# Patient Record
Sex: MALE | Race: WHITE | Employment: FULL TIME | URBAN - METROPOLITAN AREA
[De-identification: names, ages, dates, MRNs, and addresses within clinical notes are randomized per-mention and may not be internally consistent; named-entity substitution may affect disease eponyms.]

---

## 2019-06-20 ENCOUNTER — ANESTHESIA EVENT (OUTPATIENT)
Dept: ENDOSCOPY | Age: 54
End: 2019-06-20
Payer: COMMERCIAL

## 2019-07-08 ENCOUNTER — HOSPITAL ENCOUNTER (OUTPATIENT)
Age: 54
Setting detail: OUTPATIENT SURGERY
Discharge: HOME | End: 2019-07-08
Attending: INTERNAL MEDICINE | Admitting: INTERNAL MEDICINE
Payer: COMMERCIAL

## 2019-07-08 ENCOUNTER — ANESTHESIA (OUTPATIENT)
Dept: ENDOSCOPY | Age: 54
End: 2019-07-08
Payer: COMMERCIAL

## 2019-07-08 VITALS
HEART RATE: 60 BPM | TEMPERATURE: 97.9 F | WEIGHT: 205 LBS | DIASTOLIC BLOOD PRESSURE: 88 MMHG | BODY MASS INDEX: 27.77 KG/M2 | OXYGEN SATURATION: 99 % | HEIGHT: 72 IN | SYSTOLIC BLOOD PRESSURE: 127 MMHG | RESPIRATION RATE: 16 BRPM

## 2019-07-08 VITALS
DIASTOLIC BLOOD PRESSURE: 67 MMHG | RESPIRATION RATE: 17 BRPM | SYSTOLIC BLOOD PRESSURE: 105 MMHG | OXYGEN SATURATION: 99 %

## 2019-07-08 PROCEDURE — 3700000001 HC ADD 15 MINUTES (ANESTHESIA): Performed by: INTERNAL MEDICINE

## 2019-07-08 PROCEDURE — 6370000000 HC RX 637 (ALT 250 FOR IP): Performed by: INTERNAL MEDICINE

## 2019-07-08 PROCEDURE — 7100000010 HC PHASE II RECOVERY - FIRST 15 MIN: Performed by: INTERNAL MEDICINE

## 2019-07-08 PROCEDURE — 6360000002 HC RX W HCPCS: Performed by: NURSE ANESTHETIST, CERTIFIED REGISTERED

## 2019-07-08 PROCEDURE — 2580000003 HC RX 258: Performed by: ANESTHESIOLOGY

## 2019-07-08 PROCEDURE — 3700000000 HC ANESTHESIA ATTENDED CARE: Performed by: INTERNAL MEDICINE

## 2019-07-08 PROCEDURE — 2500000003 HC RX 250 WO HCPCS: Performed by: NURSE ANESTHETIST, CERTIFIED REGISTERED

## 2019-07-08 PROCEDURE — 3609010600 HC COLONOSCOPY POLYPECTOMY SNARE/COLD BIOPSY: Performed by: INTERNAL MEDICINE

## 2019-07-08 PROCEDURE — 7100000011 HC PHASE II RECOVERY - ADDTL 15 MIN: Performed by: INTERNAL MEDICINE

## 2019-07-08 PROCEDURE — 2709999900 HC NON-CHARGEABLE SUPPLY: Performed by: INTERNAL MEDICINE

## 2019-07-08 RX ORDER — SODIUM CHLORIDE 9 MG/ML
INJECTION, SOLUTION INTRAVENOUS CONTINUOUS
Status: DISCONTINUED | OUTPATIENT
Start: 2019-07-08 | End: 2019-07-08 | Stop reason: HOSPADM

## 2019-07-08 RX ORDER — SODIUM CHLORIDE 0.9 % (FLUSH) 0.9 %
10 SYRINGE (ML) INJECTION EVERY 12 HOURS SCHEDULED
Status: DISCONTINUED | OUTPATIENT
Start: 2019-07-08 | End: 2019-07-08 | Stop reason: HOSPADM

## 2019-07-08 RX ORDER — SODIUM CHLORIDE 0.9 % (FLUSH) 0.9 %
10 SYRINGE (ML) INJECTION PRN
Status: DISCONTINUED | OUTPATIENT
Start: 2019-07-08 | End: 2019-07-08 | Stop reason: HOSPADM

## 2019-07-08 RX ORDER — PROPOFOL 10 MG/ML
INJECTION, EMULSION INTRAVENOUS PRN
Status: DISCONTINUED | OUTPATIENT
Start: 2019-07-08 | End: 2019-07-08 | Stop reason: SDUPTHER

## 2019-07-08 RX ORDER — LIDOCAINE HYDROCHLORIDE 10 MG/ML
1 INJECTION, SOLUTION EPIDURAL; INFILTRATION; INTRACAUDAL; PERINEURAL
Status: DISCONTINUED | OUTPATIENT
Start: 2019-07-08 | End: 2019-07-08 | Stop reason: HOSPADM

## 2019-07-08 RX ORDER — LIDOCAINE HYDROCHLORIDE 20 MG/ML
INJECTION, SOLUTION INFILTRATION; PERINEURAL PRN
Status: DISCONTINUED | OUTPATIENT
Start: 2019-07-08 | End: 2019-07-08 | Stop reason: SDUPTHER

## 2019-07-08 RX ADMIN — PROPOFOL 20 MG: 10 INJECTION, EMULSION INTRAVENOUS at 09:11

## 2019-07-08 RX ADMIN — PROPOFOL 30 MG: 10 INJECTION, EMULSION INTRAVENOUS at 09:08

## 2019-07-08 RX ADMIN — PROPOFOL 20 MG: 10 INJECTION, EMULSION INTRAVENOUS at 09:19

## 2019-07-08 RX ADMIN — PROPOFOL 20 MG: 10 INJECTION, EMULSION INTRAVENOUS at 09:17

## 2019-07-08 RX ADMIN — PROPOFOL 20 MG: 10 INJECTION, EMULSION INTRAVENOUS at 09:15

## 2019-07-08 RX ADMIN — PROPOFOL 30 MG: 10 INJECTION, EMULSION INTRAVENOUS at 09:13

## 2019-07-08 RX ADMIN — PROPOFOL 20 MG: 10 INJECTION, EMULSION INTRAVENOUS at 09:16

## 2019-07-08 RX ADMIN — LIDOCAINE HYDROCHLORIDE 60 MG: 20 INJECTION, SOLUTION INFILTRATION; PERINEURAL at 09:06

## 2019-07-08 RX ADMIN — SODIUM CHLORIDE: 9 INJECTION, SOLUTION INTRAVENOUS at 08:11

## 2019-07-08 RX ADMIN — PROPOFOL 20 MG: 10 INJECTION, EMULSION INTRAVENOUS at 09:24

## 2019-07-08 RX ADMIN — PROPOFOL 20 MG: 10 INJECTION, EMULSION INTRAVENOUS at 09:22

## 2019-07-08 RX ADMIN — PROPOFOL 100 MG: 10 INJECTION, EMULSION INTRAVENOUS at 09:06

## 2019-07-08 ASSESSMENT — PAIN SCALES - GENERAL
PAINLEVEL_OUTOF10: 0

## 2019-07-08 ASSESSMENT — PAIN - FUNCTIONAL ASSESSMENT: PAIN_FUNCTIONAL_ASSESSMENT: 0-10

## 2019-07-08 NOTE — ANESTHESIA POSTPROCEDURE EVALUATION
Department of Anesthesiology  Postprocedure Note    Patient: Leopodlo Mota  MRN: [id number]  YOB: 1965  Date of evaluation: 7/8/2019  Time:  9:47 AM     Procedure Summary     Date:  07/08/19 Room / Location:  [facility] ENDO 01 / [facility] ENDOSCOPY    Anesthesia Start:  5236 Anesthesia Stop:  0859    Procedure:  COLONOSCOPY POLYPECTOMY SNARE/COLD BIOPSY (N/A ) Diagnosis:       (Z12.11 - COLON CANCER SCREENING)      (Z86.010 - HISTORY OF COLON POLYPS)    Surgeon:  John Pagan MD Responsible Provider:  Robyn Magallanes MD    Anesthesia Type:  MAC ASA Status:  3          Anesthesia Type: MAC    Jared Phase I: Jared Score: 10    Jared Phase II:      Last vitals: Reviewed and per EMR flowsheets.        Anesthesia Post Evaluation    Patient location during evaluation: PACU  Patient participation: complete - patient participated  Level of consciousness: awake and awake and alert  Pain score: 2  Airway patency: patent  Nausea & Vomiting: no vomiting  Complications: no  Cardiovascular status: blood pressure returned to baseline  Respiratory status: acceptable  Hydration status: euvolemic

## 2021-08-20 ENCOUNTER — TELEPHONE (OUTPATIENT)
Dept: DERMATOLOGY | Age: 56
End: 2021-08-20

## 2021-08-20 NOTE — TELEPHONE ENCOUNTER
Patient is requesting a return call to schedule an appt for lesions on scalp that his pcp suggested he see Dr Michael Maza. patient was last seen 2015. Patient will be out of town the next two weeks. Please advise. Thank you!

## 2021-08-23 NOTE — TELEPHONE ENCOUNTER
Spoke to patient regarding areas on scalp-3 lesions in a row, itchy and flaking (sun damage). Patient scheduled.

## 2021-10-15 NOTE — TELEPHONE ENCOUNTER
Pt had to cancel appt on 10/18 due to being call to a business trip. He stated he would really to get these spots checked out soon as possible, I did advise of 's Schedule and could put him on a cancellation list.   Please advise, Thank you!

## 2021-10-19 NOTE — TELEPHONE ENCOUNTER
Called and left message for patient to call back office. Please offer patient cancellation on 10/20/21 or 10/25/21 if still available.

## 2021-11-01 ENCOUNTER — OFFICE VISIT (OUTPATIENT)
Dept: DERMATOLOGY | Age: 56
End: 2021-11-01
Payer: COMMERCIAL

## 2021-11-01 VITALS — TEMPERATURE: 97.2 F

## 2021-11-01 DIAGNOSIS — L57.0 ACTINIC KERATOSIS: ICD-10-CM

## 2021-11-01 DIAGNOSIS — B35.1 ONYCHOMYCOSIS: ICD-10-CM

## 2021-11-01 DIAGNOSIS — D22.9 MULTIPLE NEVI: ICD-10-CM

## 2021-11-01 DIAGNOSIS — B35.3 TINEA PEDIS OF LEFT FOOT: ICD-10-CM

## 2021-11-01 PROCEDURE — G8484 FLU IMMUNIZE NO ADMIN: HCPCS | Performed by: DERMATOLOGY

## 2021-11-01 PROCEDURE — 17000 DESTRUCT PREMALG LESION: CPT | Performed by: DERMATOLOGY

## 2021-11-01 PROCEDURE — G8421 BMI NOT CALCULATED: HCPCS | Performed by: DERMATOLOGY

## 2021-11-01 PROCEDURE — 99203 OFFICE O/P NEW LOW 30 MIN: CPT | Performed by: DERMATOLOGY

## 2021-11-01 PROCEDURE — 3017F COLORECTAL CA SCREEN DOC REV: CPT | Performed by: DERMATOLOGY

## 2021-11-01 PROCEDURE — 1036F TOBACCO NON-USER: CPT | Performed by: DERMATOLOGY

## 2021-11-01 PROCEDURE — G8427 DOCREV CUR MEDS BY ELIG CLIN: HCPCS | Performed by: DERMATOLOGY

## 2021-11-01 NOTE — PROGRESS NOTES
[facility]  Raymundo Beach MD  [phone]      Pietro Burleson  1965    49 y.o. male     Date of Visit: 11/1/2021    Chief Complaint: skin lesions    History of Present Illness:    1. He complains of a few itchy lesions on the scalp. 2.  He complains of 2 thickened toenails of the left foot. 3.  He complains of a rash on the left foot - improved with OTC antifungal.    4.  He has multiple nevi - not aware of any changes in size, color or shape. Review of Systems:  Gen: Feels well, good sense of health. Past Medical History, Family History, Surgical History, Medications and Allergies reviewed. Past Medical History:   Diagnosis Date    Allergic rhinitis     Hypertension     Snores      Past Surgical History:   Procedure Laterality Date    COLONOSCOPY      hx of pol    COLONOSCOPY N/A 7/8/2019    COLONOSCOPY POLYPECTOMY SNARE/COLD BIOPSY performed by Faustino Baker MD at [facility]      x2    NASAL SEPTUM SURGERY      x2    SHOULDER SURGERY         Allergies   Allergen Reactions    Nafcillin      arm itching and rash    Naproxen      Tightness in throat     Outpatient Medications Marked as Taking for the 11/1/21 encounter (Office Visit) with Kandi Wise MD   Medication Sig Dispense Refill    ciclopirox (LOPROX) 0.77 % cream Apply to the left foot twice daily for 4 weeks. 30 g 2    ciclopirox (PENLAC) 8 % solution Apply to 3rd and 4th toenails of the left foot daily for up to 1 year. Remove residue every 7 days.  6 mL 2    Multiple Vitamins-Minerals (MULTIVITAMIN ADULT PO) Take 1 tablet by mouth daily      Cholecalciferol (VITAMIN D3) 1000 units CAPS Take 10,000 Units by mouth daily      OMEGA-3 KRILL OIL PO Take 750 mg by mouth every morning (before breakfast)      losartan-hydrochlorothiazide (HYZAAR) 100-12.5 MG per tablet   5    fluticasone (FLONASE) 50 MCG/ACT nasal spray   5 Physical Examination       The following were examined and determined to be normal: Psych/Neuro, Head/face, Conjunctivae/eyelids, Gums/teeth/lips, Neck, Breast/axilla/chest, Abdomen, Back, RUE, LUE and RLE. The following were examined and determined to be abnormal: Scalp/hair, LLE and Nails/digits. Well appearing. 1.  Mid vertex scalp - 1 scaly pink macule. 2.  3rd and 4th toenails of the left foot with distal onycholysis and mild subungual hyperkeratosis. 3.  Plantar surface of the left foot with diffuse scaling. 4.  Trunk and extremities with multiple well defined round to oval smooth brown macules and papules. Assessment and Plan     1. Actinic keratosis     2 cycles of liquid nitrogen applied to 1 AK on the mid vertex scalp. Patient was educated regarding the potential risks of blister formation and discomfort. Wound care was discussed. 2. Onychomycosis - mild and limited to 2 toenails    Penlac solution daily for up to 1 year. 3. Tinea pedis of left foot     Loprox cream twice daily for 2 to 4 weeks. 4. Multiple nevi - benign appearing    Sun protective behaviors, including use of at least SPF 30 sunscreen, and self skin examinations were encouraged. Call for any new or concerning lesions. Return in about 1 year (around 11/1/2022).     --Joycie Hatchet, MD

## 2022-11-01 ENCOUNTER — OFFICE VISIT (OUTPATIENT)
Dept: DERMATOLOGY | Age: 57
End: 2022-11-01
Payer: COMMERCIAL

## 2022-11-01 DIAGNOSIS — D22.9 MULTIPLE NEVI: ICD-10-CM

## 2022-11-01 DIAGNOSIS — B35.1 ONYCHOMYCOSIS: Primary | ICD-10-CM

## 2022-11-01 DIAGNOSIS — L91.8 SKIN TAG: ICD-10-CM

## 2022-11-01 PROCEDURE — G8421 BMI NOT CALCULATED: HCPCS | Performed by: DERMATOLOGY

## 2022-11-01 PROCEDURE — 99213 OFFICE O/P EST LOW 20 MIN: CPT | Performed by: DERMATOLOGY

## 2022-11-01 PROCEDURE — G8427 DOCREV CUR MEDS BY ELIG CLIN: HCPCS | Performed by: DERMATOLOGY

## 2022-11-01 PROCEDURE — G8484 FLU IMMUNIZE NO ADMIN: HCPCS | Performed by: DERMATOLOGY

## 2022-11-01 PROCEDURE — 1036F TOBACCO NON-USER: CPT | Performed by: DERMATOLOGY

## 2022-11-01 PROCEDURE — 3017F COLORECTAL CA SCREEN DOC REV: CPT | Performed by: DERMATOLOGY

## 2022-11-01 NOTE — PROGRESS NOTES
[facility]  Larisa Dupont MD  [phone]      Chao Maher  1965    56 y.o. male     Date of Visit: 11/1/2022    Chief Complaint: toenail issue, skin lesions    History of Present Illness:    1. He has chronic onychomycosis limited to the 3rd and 4th toenails of the left foot - he reports improvement with application of Penlac solution. 2.  He reports an asymptomatic lesion in the left axilla. 3.  He has multiple nevi - not aware of any changes in size, color or shape. Tinea pedis of the left foot - cleared with ciclopirox cream.       Review of Systems:  Gen: Feels well, good sense of health. Past Medical History, Family History, Surgical History, Medications and Allergies reviewed. Past Medical History:   Diagnosis Date    Allergic rhinitis     Hypertension     Snores      Past Surgical History:   Procedure Laterality Date    COLONOSCOPY      hx of [facility]    COLONOSCOPY N/A 7/8/2019    COLONOSCOPY POLYPECTOMY SNARE/COLD BIOPSY performed by Saurabh Leo MD at [address] [place] [address]      x2    NASAL SEPTUM SURGERY      x2    SHOULDER SURGERY         Allergies   Allergen Reactions    Nafcillin      arm itching and rash    Naproxen      Tightness in throat     Outpatient Medications Marked as Taking for the 11/1/22 encounter (Office Visit) with Mk Coker MD   Medication Sig Dispense Refill    ciclopirox (PENLAC) 8 % solution Apply to 3rd and 4th toenails of the left foot daily for up to 1 year. Remove residue every 7 days. 6 mL 2    ciclopirox (LOPROX) 0.77 % cream Apply to the left foot twice daily for 4 weeks.  30 g 2    Multiple Vitamins-Minerals (MULTIVITAMIN ADULT PO) Take 1 tablet by mouth daily      Cholecalciferol (VITAMIN D3) 1000 units CAPS Take 10,000 Units by mouth daily      OMEGA-3 KRILL OIL PO Take 750 mg by mouth every morning (before breakfast)      losartan-hydrochlorothiazide (HYZAAR) 100-12.5 MG per tablet   5    fluticasone (FLONASE) 50 MCG/ACT nasal spray   5           Physical Examination       The following were examined and determined to be normal: Psych/Neuro, Scalp/hair, Head/face, Conjunctivae/eyelids, Gums/teeth/lips, Neck, Breast/axilla/chest, Abdomen, Back, RUE, LUE, RLE, and LLE. The following were examined and determined to be abnormal: Nails/digits. Well appearing. 1.  Left 3rd and 4th toes - very mild distal onycholysis and subungual hyperkeratosis. 2.  Left posterior axillary region with a small pedunculated pink papule. 3.  Trunk and extremities with multiple well defined round to oval smooth brown macules and papules. Assessment and Plan     1. Onychomycosis of the left 3rd/4th toenails - improving with topical therapy    Continue ciclopirox solution daily until it clears up. 2. Skin tag     Reassurance. 3. Multiple nevi - benign appearing    Sun protective behaviors, including use of at least SPF 30 sunscreen, and self skin examinations were encouraged. Call for any new or concerning lesions. Return in about 1 year (around 11/1/2023).     --Foy Romberg, MD

## 2023-03-13 ENCOUNTER — TELEPHONE (OUTPATIENT)
Dept: DERMATOLOGY | Age: 58
End: 2023-03-13

## 2023-03-13 RX ORDER — VALACYCLOVIR HYDROCHLORIDE 1 G/1
2000 TABLET, FILM COATED ORAL 2 TIMES DAILY
Qty: 12 TABLET | Refills: 0 | Status: SHIPPED | OUTPATIENT
Start: 2023-03-13 | End: 2023-03-14

## 2023-03-13 NOTE — TELEPHONE ENCOUNTER
Pt requesting a refill for Valtrex pt stated he has a cold sore on lip   Please send to [facility]   Please advise   Thanks   C/b [phone]

## 2023-11-01 ENCOUNTER — OFFICE VISIT (OUTPATIENT)
Dept: DERMATOLOGY | Age: 58
End: 2023-11-01
Payer: COMMERCIAL

## 2023-11-01 DIAGNOSIS — D22.9 MULTIPLE NEVI: Primary | ICD-10-CM

## 2023-11-01 DIAGNOSIS — B35.3 TINEA PEDIS OF LEFT FOOT: ICD-10-CM

## 2023-11-01 PROCEDURE — G8421 BMI NOT CALCULATED: HCPCS | Performed by: DERMATOLOGY

## 2023-11-01 PROCEDURE — 1036F TOBACCO NON-USER: CPT | Performed by: DERMATOLOGY

## 2023-11-01 PROCEDURE — 99213 OFFICE O/P EST LOW 20 MIN: CPT | Performed by: DERMATOLOGY

## 2023-11-01 PROCEDURE — 3017F COLORECTAL CA SCREEN DOC REV: CPT | Performed by: DERMATOLOGY

## 2023-11-01 PROCEDURE — G8427 DOCREV CUR MEDS BY ELIG CLIN: HCPCS | Performed by: DERMATOLOGY

## 2023-11-01 PROCEDURE — G8484 FLU IMMUNIZE NO ADMIN: HCPCS | Performed by: DERMATOLOGY

## 2023-11-01 RX ORDER — VALACYCLOVIR HYDROCHLORIDE 1 G/1
2000 TABLET, FILM COATED ORAL 2 TIMES DAILY
Qty: 12 TABLET | Refills: 0 | Status: SHIPPED | OUTPATIENT
Start: 2023-11-01 | End: 2023-11-02

## 2023-11-01 NOTE — PROGRESS NOTES
[facility]  Bradly Robles MD  [phone]      Mary Ann Carvajal  1965    66 y.o. male     Date of Visit: 11/1/2023    Chief Complaint: moles    History of Present Illness:    1. He presents today for evaluation of multiple moles on the trunk and extremities-not aware of any changes in size, color, or shape. 2.  He reports a recurrence of an asymptomatic rash on the left foot. He has a history of tinea pedis. He also has a history of onychomycosis of the left 3rd and 4th toenails - cleared up with Penla. Review of Systems:  Gen: Feels well, good sense of health. Past Medical History, Family History, Surgical History, Medications and Allergies reviewed. Past Medical History:   Diagnosis Date    Allergic rhinitis     Hypertension     Snores      Past Surgical History:   Procedure Laterality Date    COLONOSCOPY      hx of pol    COLONOSCOPY N/A 7/8/2019    COLONOSCOPY POLYPECTOMY SNARE/COLD BIOPSY performed by Berry Parrish MD at [address]      x2    NASAL SEPTUM SURGERY      x2    SHOULDER SURGERY         Allergies   Allergen Reactions    Nafcillin      arm itching and rash    Naproxen      Tightness in throat     Outpatient Medications Marked as Taking for the 11/1/23 encounter (Office Visit) with Edin Blanc MD   Medication Sig Dispense Refill    ciclopirox (LOPROX) 0.77 % cream Apply to the left foot twice daily for 4 weeks. 30 g 2    valACYclovir (VALTREX) 1 g tablet Take 2 tablets by mouth 2 times daily for 1 day Take at the first symptoms of recurrence of a cold sore.  12 tablet 0    Multiple Vitamins-Minerals (MULTIVITAMIN ADULT PO) Take 1 tablet by mouth daily      Cholecalciferol (VITAMIN D3) 1000 units CAPS Take 10,000 Units by mouth daily      OMEGA-3 KRILL OIL PO Take 750 mg by mouth every morning (before breakfast)      losartan-hydrochlorothiazide (HYZAAR) 100-12.5 MG per tablet   5    fluticasone

## 2024-03-11 ENCOUNTER — TELEPHONE (OUTPATIENT)
Dept: DERMATOLOGY | Age: 59
End: 2024-03-11

## 2024-03-11 NOTE — TELEPHONE ENCOUNTER
Called and spoke to patient to clarify whether patient was needing ciclopirox cream or solution.   He needs the solution.

## 2024-03-11 NOTE — TELEPHONE ENCOUNTER
Pt calling to refill valatyclovir and ticlopirox please call him to confirm we can refill [phone]

## 2024-03-12 RX ORDER — VALACYCLOVIR HYDROCHLORIDE 1 G/1
2000 TABLET, FILM COATED ORAL 2 TIMES DAILY
Qty: 12 TABLET | Refills: 0 | Status: SHIPPED | OUTPATIENT
Start: 2024-03-12 | End: 2024-03-13

## 2024-03-12 RX ORDER — CICLOPIROX 80 MG/ML
SOLUTION TOPICAL
Qty: 6 ML | Refills: 2 | Status: SHIPPED | OUTPATIENT
Start: 2024-03-12

## 2024-08-19 ENCOUNTER — TELEPHONE (OUTPATIENT)
Dept: DERMATOLOGY | Age: 59
End: 2024-08-19

## 2024-08-29 ENCOUNTER — OFFICE VISIT (OUTPATIENT)
Dept: DERMATOLOGY | Age: 59
End: 2024-08-29
Payer: COMMERCIAL

## 2024-08-29 DIAGNOSIS — L63.9 ALOPECIA AREATA: Primary | ICD-10-CM

## 2024-08-29 DIAGNOSIS — B35.3 TINEA PEDIS OF LEFT FOOT: ICD-10-CM

## 2024-08-29 DIAGNOSIS — B35.1 ONYCHOMYCOSIS: ICD-10-CM

## 2024-08-29 PROCEDURE — G8421 BMI NOT CALCULATED: HCPCS | Performed by: DERMATOLOGY

## 2024-08-29 PROCEDURE — 3017F COLORECTAL CA SCREEN DOC REV: CPT | Performed by: DERMATOLOGY

## 2024-08-29 PROCEDURE — G8427 DOCREV CUR MEDS BY ELIG CLIN: HCPCS | Performed by: DERMATOLOGY

## 2024-08-29 PROCEDURE — 99213 OFFICE O/P EST LOW 20 MIN: CPT | Performed by: DERMATOLOGY

## 2024-08-29 PROCEDURE — 1036F TOBACCO NON-USER: CPT | Performed by: DERMATOLOGY

## 2024-08-29 NOTE — PROGRESS NOTES
[facility]  Anand Cruz MD  [phone]      Claudy Ray  1965    59 y.o. male     Date of Visit: 8/29/2024    Chief Complaint: hair loss    History of Present Illness:    1.  He presents today for an accumulation of alopecic patches on the scalp and extremities.  He is interested in treatment.    2.  He has a history of onychomycosis of the first 3 toenails of the left foot that improved in the past with Penlac.  He recently restarted using it with progressive toenail dystrophy.      Review of Systems:  Gen: Feels well, good sense of health.    Past Medical History, Family History, Surgical History, Medications and Allergies reviewed.    Past Medical History:   Diagnosis Date    Allergic rhinitis     Hypertension     Snores      Past Surgical History:   Procedure Laterality Date    COLONOSCOPY      hx of polps    COLONOSCOPY N/A 7/8/2019    COLONOSCOPY POLYPECTOMY SNARE/COLD BIOPSY performed by Yanira Salcido MD at [facility] ENDOSCOPY    HERNIA REPAIR      KNEE SURGERY      x2    NASAL SEPTUM SURGERY      x2    SHOULDER SURGERY         Allergies   Allergen Reactions    Nafcillin      arm itching and rash    Naproxen      Tightness in throat     Outpatient Medications Marked as Taking for the 8/29/24 encounter (Office Visit) with Anand Cruz MD   Medication Sig Dispense Refill    ciclopirox (PENLAC) 8 % solution Apply to 3rd and 4th toenails of the left foot daily for up to 1 year.  Remove residue every 7 days. 6 mL 2    ciclopirox (LOPROX) 0.77 % cream Apply to the left foot twice daily for 4 weeks. 30 g 2    ciclopirox (PENLAC) 8 % solution Apply to 3rd and 4th toenails of the left foot daily for up to 1 year.  Remove residue every 7 days. 6 mL 2    Multiple Vitamins-Minerals (MULTIVITAMIN ADULT PO) Take 1 tablet by mouth daily      Cholecalciferol (VITAMIN D3) 1000 units CAPS Take 10,000 Units by mouth daily      OMEGA-3 KRILL OIL PO Take 750 mg by mouth every morning

## 2024-09-30 ENCOUNTER — OFFICE VISIT (OUTPATIENT)
Dept: DERMATOLOGY | Age: 59
End: 2024-09-30
Payer: COMMERCIAL

## 2024-09-30 DIAGNOSIS — L63.9 ALOPECIA AREATA: Primary | ICD-10-CM

## 2024-09-30 PROCEDURE — 11901 INJECT SKIN LESIONS >7: CPT | Performed by: DERMATOLOGY

## 2024-09-30 NOTE — PROGRESS NOTES
[facility]at[facility]  Anand Cruz MD  [phone]      Claudy Ray  1965    59 y.o. male     Date of Visit: 9/30/2024    Chief Complaint: alopecia areata    History of Present Illness:    He was last seen about 4 weeks ago.    He returns today to follow-up for patchy alopecia areata of the scalp with moderately extensive involvement.  He had intralesional Kenalog injections to 15 patches on the scalp at last visit.  Has not noted much growth yet.       Review of Systems:  Gen: Feels well, good sense of health.    Past Medical History, Family History, Surgical History, Medications and Allergies reviewed.    Past Medical History:   Diagnosis Date    Allergic rhinitis     Hypertension     Snores      Past Surgical History:   Procedure Laterality Date    COLONOSCOPY      hx of polps    COLONOSCOPY N/A 7/8/2019    COLONOSCOPY POLYPECTOMY SNARE/COLD BIOPSY performed by Yanira Salcido MD at [facility] ENDOSCOPY    HERNIA REPAIR      KNEE SURGERY      x2    NASAL SEPTUM SURGERY      x2    SHOULDER SURGERY         Allergies   Allergen Reactions    Nafcillin      arm itching and rash    Naproxen      Tightness in throat     Outpatient Medications Marked as Taking for the 9/30/24 encounter (Office Visit) with Anand Cruz MD   Medication Sig Dispense Refill    ciclopirox (PENLAC) 8 % solution Apply to 3rd and 4th toenails of the left foot daily for up to 1 year.  Remove residue every 7 days. 6 mL 2    ciclopirox (LOPROX) 0.77 % cream Apply to the left foot twice daily for 4 weeks. 30 g 2    ciclopirox (PENLAC) 8 % solution Apply to 3rd and 4th toenails of the left foot daily for up to 1 year.  Remove residue every 7 days. 6 mL 2    Multiple Vitamins-Minerals (MULTIVITAMIN ADULT PO) Take 1 tablet by mouth daily      Cholecalciferol (VITAMIN D3) 1000 units CAPS Take 10 capsules by mouth daily      OMEGA-3 KRILL OIL PO Take 750 mg by mouth every morning (before breakfast)

## 2024-10-29 ENCOUNTER — OFFICE VISIT (OUTPATIENT)
Dept: DERMATOLOGY | Age: 59
End: 2024-10-29
Payer: COMMERCIAL

## 2024-10-29 DIAGNOSIS — L63.9 ALOPECIA AREATA: Primary | ICD-10-CM

## 2024-10-29 PROCEDURE — 11901 INJECT SKIN LESIONS >7: CPT | Performed by: DERMATOLOGY

## 2024-10-29 NOTE — PROGRESS NOTES
[facility]at[facility]  Anand Cruz MD  [phone]      Claudy Ray  1965    59 y.o. male     Date of Visit: 10/29/2024    Chief Complaint: alopecia areata    History of Present Illness:    He returns today to follow-up for extensive alopecia areata of the scalp.  He has had 2 series of injections with intralesional Kenalog over the last couple of months.  He presents today for additional injections.      Review of Systems:  Gen: Feels well, good sense of health.      Past Medical History, Family History, Surgical History, Medications and Allergies reviewed.    Past Medical History:   Diagnosis Date    Allergic rhinitis     Hypertension     Snores      Past Surgical History:   Procedure Laterality Date    COLONOSCOPY      hx of polps    COLONOSCOPY N/A 7/8/2019    COLONOSCOPY POLYPECTOMY SNARE/COLD BIOPSY performed by Yanira Salcido MD at [facility] ENDOSCOPY    HERNIA REPAIR      KNEE SURGERY      x2    NASAL SEPTUM SURGERY      x2    SHOULDER SURGERY         Allergies   Allergen Reactions    Nafcillin      arm itching and rash    Naproxen      Tightness in throat     Outpatient Medications Marked as Taking for the 10/29/24 encounter (Office Visit) with Anand Cruz MD   Medication Sig Dispense Refill    ciclopirox (PENLAC) 8 % solution Apply to 3rd and 4th toenails of the left foot daily for up to 1 year.  Remove residue every 7 days. 6 mL 2    ciclopirox (LOPROX) 0.77 % cream Apply to the left foot twice daily for 4 weeks. 30 g 2    ciclopirox (PENLAC) 8 % solution Apply to 3rd and 4th toenails of the left foot daily for up to 1 year.  Remove residue every 7 days. 6 mL 2    Multiple Vitamins-Minerals (MULTIVITAMIN ADULT PO) Take 1 tablet by mouth daily      Cholecalciferol (VITAMIN D3) 1000 units CAPS Take 10 capsules by mouth daily      OMEGA-3 KRILL OIL PO Take 750 mg by mouth every morning (before breakfast)      losartan-hydrochlorothiazide (HYZAAR) 100-12.5 MG per tablet   5

## 2024-11-11 RX ORDER — CICLOPIROX 80 MG/ML
SOLUTION TOPICAL
Qty: 6 ML | Refills: 2 | Status: SHIPPED | OUTPATIENT
Start: 2024-11-11

## 2024-11-13 ENCOUNTER — TELEPHONE (OUTPATIENT)
Dept: DERMATOLOGY | Age: 59
End: 2024-11-13

## 2024-11-13 NOTE — TELEPHONE ENCOUNTER
Submitted PA for Ciclopirox  Via CMM Key: [id number]   STATUS: This medication or product is on your plan's list of covered drugs. Prior authorization is not required at this time. If your pharmacy has questions regarding the processing of your prescription, please have them call the [employer] pharmacy help desk at [phone].     If this requires a response please respond to the pool ( P MHCX PSC MEDICATION PRE-AUTH).      Thank you please advise patient.

## 2024-11-25 ENCOUNTER — OFFICE VISIT (OUTPATIENT)
Dept: DERMATOLOGY | Age: 59
End: 2024-11-25
Payer: COMMERCIAL

## 2024-11-25 DIAGNOSIS — L63.9 ALOPECIA AREATA: Primary | ICD-10-CM

## 2024-11-25 PROCEDURE — 11901 INJECT SKIN LESIONS >7: CPT | Performed by: DERMATOLOGY

## 2024-11-25 NOTE — PROGRESS NOTES
[facility]  Anand Cruz MD  [phone]      Claudy Ray  1965    59 y.o. male     Date of Visit: 11/25/2024    Chief Complaint: alopecia areata    History of Present Illness:    He returns today for extensive alopecia areata of the scalp.  Has had gradual improvement with intralesional Kenalog injections.  He has noticed a new patch on the right lateral eyebrow and also on the right side of the beard.      Review of Systems:  Gen: Feels well, good sense of health.    Past Medical History, Family History, Surgical History, Medications and Allergies reviewed.    Past Medical History:   Diagnosis Date    Allergic rhinitis     Hypertension     Snores      Past Surgical History:   Procedure Laterality Date    COLONOSCOPY      hx of polps    COLONOSCOPY N/A 7/8/2019    COLONOSCOPY POLYPECTOMY SNARE/COLD BIOPSY performed by Yanira Salcido MD at [facility] ENDOSCOPY    HERNIA REPAIR      KNEE SURGERY      x2    NASAL SEPTUM SURGERY      x2    SHOULDER SURGERY         Allergies   Allergen Reactions    Nafcillin      arm itching and rash    Naproxen      Tightness in throat     Outpatient Medications Marked as Taking for the 11/25/24 encounter (Office Visit) with Anand Cruz MD   Medication Sig Dispense Refill    ciclopirox (PENLAC) 8 % solution Apply to 3rd and 4th toenails of the left foot daily for up to 1 year.  Remove residue every 7 days. 6 mL 2    ciclopirox (LOPROX) 0.77 % cream Apply to the left foot twice daily for 4 weeks. 30 g 2    ciclopirox (PENLAC) 8 % solution Apply to 3rd and 4th toenails of the left foot daily for up to 1 year.  Remove residue every 7 days. 6 mL 2    Multiple Vitamins-Minerals (MULTIVITAMIN ADULT PO) Take 1 tablet by mouth daily      Cholecalciferol (VITAMIN D3) 1000 units CAPS Take 10 capsules by mouth daily      OMEGA-3 KRILL OIL PO Take 750 mg by mouth every morning (before breakfast)      losartan-hydrochlorothiazide (HYZAAR) 100-12.5 MG per

## 2024-12-17 ENCOUNTER — OFFICE VISIT (OUTPATIENT)
Age: 59
End: 2024-12-17
Payer: COMMERCIAL

## 2024-12-17 DIAGNOSIS — L63.9 ALOPECIA AREATA: Primary | ICD-10-CM

## 2024-12-17 PROCEDURE — 3017F COLORECTAL CA SCREEN DOC REV: CPT | Performed by: DERMATOLOGY

## 2024-12-17 PROCEDURE — 96372 THER/PROPH/DIAG INJ SC/IM: CPT | Performed by: DERMATOLOGY

## 2024-12-17 PROCEDURE — 1036F TOBACCO NON-USER: CPT | Performed by: DERMATOLOGY

## 2024-12-17 PROCEDURE — 99214 OFFICE O/P EST MOD 30 MIN: CPT | Performed by: DERMATOLOGY

## 2024-12-17 PROCEDURE — G8427 DOCREV CUR MEDS BY ELIG CLIN: HCPCS | Performed by: DERMATOLOGY

## 2024-12-17 PROCEDURE — G8484 FLU IMMUNIZE NO ADMIN: HCPCS | Performed by: DERMATOLOGY

## 2024-12-17 PROCEDURE — G8421 BMI NOT CALCULATED: HCPCS | Performed by: DERMATOLOGY

## 2024-12-17 RX ORDER — TRIAMCINOLONE ACETONIDE 40 MG/ML
80 INJECTION, SUSPENSION INTRA-ARTICULAR; INTRAMUSCULAR ONCE
Status: COMPLETED | OUTPATIENT
Start: 2024-12-17 | End: 2024-12-17

## 2024-12-17 RX ADMIN — TRIAMCINOLONE ACETONIDE 80 MG: 40 INJECTION, SUSPENSION INTRA-ARTICULAR; INTRAMUSCULAR at 08:42

## 2024-12-17 NOTE — PROGRESS NOTES
mg by mouth every morning (before breakfast)      losartan-hydrochlorothiazide (HYZAAR) 100-12.5 MG per tablet   5    fluticasone (FLONASE) 50 MCG/ACT nasal spray   5         Physical Examination       Well-appearing.    1.  Right upper lip with a small alopecic macule.  Eyebrows intact.  Eyelashes intact.  Frontal scalp with near complete loss of hair.  Temporal, parietal and occipital portions of the scalp with smaller alopecic patches.                       Assessment and Plan     1. Alopecia areata - extensive, only mild improvement with intralesional Kenalog injections; not at treatment goal    We discussed other options.  He is agreeable to intramuscular Kenalog today.  We also discussed consideration of Nithin inhibitor therapy particularly with Olumiant.  He will consider in the future.    Intramuscular Kenalog 80 mg administered subcutaneously today.  Discussed risks including effects on appetite, sleep, weight gain, and adverse bone effects.         Return in about 4 weeks (around 1/14/2025).    --Anand Cruz MD

## 2025-01-22 ENCOUNTER — OFFICE VISIT (OUTPATIENT)
Age: 60
End: 2025-01-22
Payer: COMMERCIAL

## 2025-01-22 DIAGNOSIS — L63.9 ALOPECIA AREATA: Primary | ICD-10-CM

## 2025-01-22 PROCEDURE — G8427 DOCREV CUR MEDS BY ELIG CLIN: HCPCS | Performed by: DERMATOLOGY

## 2025-01-22 PROCEDURE — G8421 BMI NOT CALCULATED: HCPCS | Performed by: DERMATOLOGY

## 2025-01-22 PROCEDURE — 3017F COLORECTAL CA SCREEN DOC REV: CPT | Performed by: DERMATOLOGY

## 2025-01-22 PROCEDURE — 96372 THER/PROPH/DIAG INJ SC/IM: CPT | Performed by: DERMATOLOGY

## 2025-01-22 PROCEDURE — 99213 OFFICE O/P EST LOW 20 MIN: CPT | Performed by: DERMATOLOGY

## 2025-01-22 PROCEDURE — 1036F TOBACCO NON-USER: CPT | Performed by: DERMATOLOGY

## 2025-01-22 RX ORDER — TRIAMCINOLONE ACETONIDE 40 MG/ML
80 INJECTION, SUSPENSION INTRA-ARTICULAR; INTRAMUSCULAR ONCE
Status: COMPLETED | OUTPATIENT
Start: 2025-01-22 | End: 2025-01-22

## 2025-01-22 RX ADMIN — TRIAMCINOLONE ACETONIDE 80 MG: 40 INJECTION, SUSPENSION INTRA-ARTICULAR; INTRAMUSCULAR at 08:27

## 2025-01-22 NOTE — PROGRESS NOTES
[facility] Dermat[facility]  Anand Cruz MD  [phone]      Claudy Ray  1965    59 y.o. male     Date of Visit: 1/22/2025    Chief Complaint: alopecia areata    History of Present Illness:    Henry is a 59-year-old white male who presents today to follow-up for severe alopecia areata affecting the scalp, right eyebrow and beard region.  He had minor improvement with intralesional Kenalog injections initially.  At last visit he received intramuscular Kenalog 80 mg with marked improvement.  He reports new hair growth on the scalp and right eyebrow.  He reports a shrinking alopecic patch on the left forearm.      Review of Systems:  Gen: Feels well, good sense of health.    Past Medical History, Family History, Surgical History, Medications and Allergies reviewed.    Past Medical History:   Diagnosis Date    Allergic rhinitis     Hypertension     Snores      Past Surgical History:   Procedure Laterality Date    COLONOSCOPY      hx of polps    COLONOSCOPY N/A 7/8/2019    COLONOSCOPY POLYPECTOMY SNARE/COLD BIOPSY performed by Yanira Salcido MD at [facility] ENDOSCOPY    HERNIA REPAIR      KNEE SURGERY      x2    NASAL SEPTUM SURGERY      x2    SHOULDER SURGERY         Allergies   Allergen Reactions    Nafcillin      arm itching and rash    Naproxen      Tightness in throat     Outpatient Medications Marked as Taking for the 1/22/25 encounter (Office Visit) with Anand Cruz MD   Medication Sig Dispense Refill    ciclopirox (PENLAC) 8 % solution Apply to 3rd and 4th toenails of the left foot daily for up to 1 year.  Remove residue every 7 days. 6 mL 2    ciclopirox (LOPROX) 0.77 % cream Apply to the left foot twice daily for 4 weeks. 30 g 2    ciclopirox (PENLAC) 8 % solution Apply to 3rd and 4th toenails of the left foot daily for up to 1 year.  Remove residue every 7 days. 6 mL 2    Multiple Vitamins-Minerals (MULTIVITAMIN ADULT PO) Take 1 tablet by mouth daily      Cholecalciferol (VITAMIN

## 2025-02-12 ENCOUNTER — TELEPHONE (OUTPATIENT)
Age: 60
End: 2025-02-12

## 2025-02-12 NOTE — TELEPHONE ENCOUNTER
Pt called and states needs to r/s appointment for 02-19,he states he has to come every month to get this treatment. I didn't cancel it yet unitll I know we can get him soon.            [phone]

## 2025-02-21 ENCOUNTER — TELEPHONE (OUTPATIENT)
Age: 60
End: 2025-02-21

## 2025-02-21 NOTE — TELEPHONE ENCOUNTER
Patient needs to reschedule 1 month ILK.  He needs to go out of town for his mom who is ill.  He will be back in town 2/28.  Could he come in after that?  [phone]

## 2025-03-04 ENCOUNTER — OFFICE VISIT (OUTPATIENT)
Age: 60
End: 2025-03-04

## 2025-03-04 DIAGNOSIS — L63.9 ALOPECIA AREATA: Primary | ICD-10-CM

## 2025-03-04 NOTE — PROGRESS NOTES
mg by mouth every morning (before breakfast)      losartan-hydrochlorothiazide (HYZAAR) 100-12.5 MG per tablet   5    fluticasone (FLONASE) 50 MCG/ACT nasal spray   5     Physical Examination     Well-appearing.    1.  Scattered on the scalp are several smaller round oval shaped smooth alopecic patches.  Hair regrowth noted on the frontal temporal and superior parietal portions of the scalp.  Right lateral upper lip with few round smooth alopecic patches.  Eyebrow with complete regrowth.                       Assessment and Plan     1. Extensive alopecia areata of the scalp and focally involvement the eyebrow and beard - improved with systemic steroids (IM Kenalog x 2).      Intralesional Kenalog 5 mg/mL - 1.5 mL injected into 8 small patches on the scalp.     Discussed consideration of Litfulo if worsens.         Return in 4 to 6 weeks.     --Anand Cruz MD

## 2025-04-17 ENCOUNTER — OFFICE VISIT (OUTPATIENT)
Age: 60
End: 2025-04-17
Payer: COMMERCIAL

## 2025-04-17 DIAGNOSIS — L63.9 ALOPECIA AREATA: Primary | ICD-10-CM

## 2025-04-17 DIAGNOSIS — Z79.899 HIGH RISK MEDICATION USE: ICD-10-CM

## 2025-04-17 PROCEDURE — G8427 DOCREV CUR MEDS BY ELIG CLIN: HCPCS | Performed by: DERMATOLOGY

## 2025-04-17 PROCEDURE — 1036F TOBACCO NON-USER: CPT | Performed by: DERMATOLOGY

## 2025-04-17 PROCEDURE — G8421 BMI NOT CALCULATED: HCPCS | Performed by: DERMATOLOGY

## 2025-04-17 PROCEDURE — 99214 OFFICE O/P EST MOD 30 MIN: CPT | Performed by: DERMATOLOGY

## 2025-04-17 PROCEDURE — 3017F COLORECTAL CA SCREEN DOC REV: CPT | Performed by: DERMATOLOGY

## 2025-04-17 NOTE — PROGRESS NOTES
[facility]  Anadn Cruz MD  [phone]      Claudy Ray  1965    60 y.o. male     Date of Visit: 4/17/2025    Chief Complaint: alopecia areata    History of Present Illness:    He returns today to follow-up for extensive alopecia areata affecting the scalp, eyebrows, beard and left forearm.  He experienced marked temporary improvement but not complete regrowth with intramuscular Kenalog injections administered in December 2024 and January 2025.  He had few persistent patches at last visit that were treated with intralesional Kenalog.  Unfortunately he reports progression of the disease since he was last seen about 6 weeks ago.  He has developed new alopecic patches on the scalp and within the beard.    Past medical history notable for hypertension controlled with losartan.  He reports that he had a cardiac calcium CT score and was offered a statin by his primary care physician but elected not to take it at this time.      Review of Systems:  Gen: Feels well, good sense of health.    Past Medical History, Family History, Surgical History, Medications and Allergies reviewed.    Past Medical History:   Diagnosis Date    Allergic rhinitis     Hypertension     Snores      Past Surgical History:   Procedure Laterality Date    COLONOSCOPY      hx of polps    COLONOSCOPY N/A 7/8/2019    COLONOSCOPY POLYPECTOMY SNARE/COLD BIOPSY performed by Yanira Salcido MD at [facility] ENDOSCOPY    HERNIA REPAIR      KNEE SURGERY      x2    NASAL SEPTUM SURGERY      x2    SHOULDER SURGERY         Allergies   Allergen Reactions    Nafcillin      arm itching and rash    Naproxen      Tightness in throat     Outpatient Medications Marked as Taking for the 4/17/25 encounter (Office Visit) with Anand Cruz MD   Medication Sig Dispense Refill    ciclopirox (PENLAC) 8 % solution Apply to 3rd and 4th toenails of the left foot daily for up to 1 year.  Remove residue every 7 days. 6 mL 2    ciclopirox

## 2025-04-25 LAB
M TB TUBERC IFN-G/MITOGEN IGNF BLD: NORMAL IU/ML
POSITIVE CONTROL: 9.97 IU/ML
QFTB TB2 MINUS NIL: NORMAL IU/ML
QUANTIFERON (R) TB GOLD (INCUBATED): NEGATIVE
QUANTIFERON MITOGEN MINUS NIL: 0.03 IU/ML

## 2025-05-12 ENCOUNTER — RESULTS FOLLOW-UP (OUTPATIENT)
Age: 60
End: 2025-05-12

## 2025-05-15 RX ORDER — CICLOPIROX 80 MG/ML
SOLUTION TOPICAL
Qty: 19.8 ML | Refills: 0 | Status: SHIPPED | OUTPATIENT
Start: 2025-05-15

## 2025-09-03 ENCOUNTER — OFFICE VISIT (OUTPATIENT)
Age: 60
End: 2025-09-03
Payer: COMMERCIAL

## 2025-09-03 DIAGNOSIS — D22.9 MULTIPLE NEVI: ICD-10-CM

## 2025-09-03 DIAGNOSIS — B35.3 TINEA PEDIS OF LEFT FOOT: ICD-10-CM

## 2025-09-03 DIAGNOSIS — B35.1 ONYCHOMYCOSIS: ICD-10-CM

## 2025-09-03 DIAGNOSIS — L63.9 ALOPECIA AREATA: Primary | ICD-10-CM

## 2025-09-03 PROCEDURE — G8427 DOCREV CUR MEDS BY ELIG CLIN: HCPCS | Performed by: DERMATOLOGY

## 2025-09-03 PROCEDURE — 99213 OFFICE O/P EST LOW 20 MIN: CPT | Performed by: DERMATOLOGY

## 2025-09-03 PROCEDURE — 1036F TOBACCO NON-USER: CPT | Performed by: DERMATOLOGY

## 2025-09-03 PROCEDURE — 3017F COLORECTAL CA SCREEN DOC REV: CPT | Performed by: DERMATOLOGY

## 2025-09-03 PROCEDURE — G8421 BMI NOT CALCULATED: HCPCS | Performed by: DERMATOLOGY

## 2025-09-03 RX ORDER — CICLOPIROX OLAMINE 7.7 MG/G
CREAM TOPICAL
Qty: 30 G | Refills: 2 | Status: SHIPPED | OUTPATIENT
Start: 2025-09-03

## (undated) DEVICE — 60 ML SYRINGE,REGULAR TIP: Brand: MONOJECT

## (undated) DEVICE — Device: Brand: DISPOSABLE ELECTROSURGICAL SNARE

## (undated) DEVICE — SOLUTION IV IRRIG WATER 500ML POUR BRL ST 2F7113

## (undated) DEVICE — TRAP SPEC RETRV CLR PLAS POLYP IN LN SUCT QUIK CTCH

## (undated) DEVICE — PROCEDURE KIT ENDOSCP CUST

## (undated) DEVICE — SET VLV 3 PC AWS DISPOSABLE GRDIAN SCOPEVALET

## (undated) DEVICE — BW-412T DISP COMBO CLEANING BRUSH: Brand: SINGLE USE COMBINATION CLEANING BRUSH

## (undated) DEVICE — GOWN AURORA NONREINF LG: Brand: MEDLINE INDUSTRIES, INC.